# Patient Record
Sex: FEMALE | Race: WHITE | URBAN - METROPOLITAN AREA
[De-identification: names, ages, dates, MRNs, and addresses within clinical notes are randomized per-mention and may not be internally consistent; named-entity substitution may affect disease eponyms.]

---

## 2021-10-27 ENCOUNTER — OFFICE VISIT (OUTPATIENT)
Dept: URBAN - METROPOLITAN AREA CLINIC 78 | Facility: CLINIC | Age: 51
End: 2021-10-27
Payer: COMMERCIAL

## 2021-10-27 VITALS
HEIGHT: 65 IN | WEIGHT: 147.2 LBS | BODY MASS INDEX: 24.53 KG/M2 | HEART RATE: 78 BPM | SYSTOLIC BLOOD PRESSURE: 115 MMHG | DIASTOLIC BLOOD PRESSURE: 74 MMHG | TEMPERATURE: 97.3 F

## 2021-10-27 DIAGNOSIS — Z12.11 SCREENING FOR COLON CANCER: ICD-10-CM

## 2021-10-27 DIAGNOSIS — E83.110 HEREDITARY HEMOCHROMATOSIS: ICD-10-CM

## 2021-10-27 DIAGNOSIS — E83.10 CIRRHOSIS DUE TO HEMOCHROMATOSIS: ICD-10-CM

## 2021-10-27 PROBLEM — 19943007: Status: ACTIVE | Noted: 2021-10-27

## 2021-10-27 PROCEDURE — 99243 OFF/OP CNSLTJ NEW/EST LOW 30: CPT | Performed by: INTERNAL MEDICINE

## 2021-10-27 PROCEDURE — 99203 OFFICE O/P NEW LOW 30 MIN: CPT | Performed by: INTERNAL MEDICINE

## 2021-10-27 RX ORDER — MELATONIN 5 MG
1 TABLET IN THE EVENING TABLET ORAL ONCE A DAY
Status: ACTIVE | COMMUNITY

## 2021-10-27 RX ORDER — SODIUM PICOSULFATE, MAGNESIUM OXIDE, AND ANHYDROUS CITRIC ACID 10; 3.5; 12 MG/160ML; G/160ML; G/160ML
160 ML DAY #1 AND 160 ML DAY # 2 LIQUID ORAL ONCE A DAY
Qty: 320 MILLIMETER | Refills: 0 | OUTPATIENT
Start: 2021-10-27 | End: 2021-10-29

## 2021-10-27 NOTE — HPI-TODAY'S VISIT:
Patient was referred by Dr. Ajith Corona /Dr Yumiko Ayala  A copy of this document will be sent to the physician.  Patient seen in consultation for dx of cirrhosis on imaging.  Patient was dx with Hemochromatosis  when she was noted to have low platelets by her Gynae. PCP referred her to Hematologist Dr Corona who  dx her with Hereditary Hemochromatosis . She states that a  genetic testing  was done  Patient has only had two  Phlebotomy q  2 weeks and is currently on monthly phlebotomy protocol , scheduled to follow up with Oncologist q 3 months .   She has quit Etoh / red meat /iron rich foods based    Recent labs 9/28/21   Hg 12.8  Hct 36.9  Platelets 149  Ferritin level pending  SNEHA negative  Hepatitis panel neg including Hep A/B/C not immune to Hepatitis B   Patient had reached Menopause  She reports some fatigue and arthralgias   MRI of liver 9/21 : No masses on liver  Iron levels 29          GF ( M )  uterine cancer at age 47  GM ( P ) lung cancer  No Fhx of  colon or GI or liver cancer   Patient here for routine colonoscopy evaluation. The patient denies specific GI complaints with no rectal bleeding, abdominal pain, or change in bowel habits  (-) Chest pain or SOB

## 2021-10-31 LAB
ACTIN (SMOOTH MUSCLE) ANTIBODY: 28
IMMUNOGLOBULIN A, QN, SERUM: 149
IMMUNOGLOBULIN E, TOTAL: 2
IMMUNOGLOBULIN G, QN, SERUM: 1269
IMMUNOGLOBULIN M, QN, SERUM: 1555
LIVER-KIDNEY MICROSOMAL AB: 0.8
MITOCHONDRIAL (M2) ANTIBODY: 128.7

## 2021-11-01 ENCOUNTER — WEB ENCOUNTER (OUTPATIENT)
Dept: URBAN - METROPOLITAN AREA CLINIC 78 | Facility: CLINIC | Age: 51
End: 2021-11-01

## 2021-11-03 ENCOUNTER — OFFICE VISIT (OUTPATIENT)
Dept: URBAN - METROPOLITAN AREA CLINIC 78 | Facility: CLINIC | Age: 51
End: 2021-11-03
Payer: COMMERCIAL

## 2021-11-03 VITALS
TEMPERATURE: 97.8 F | SYSTOLIC BLOOD PRESSURE: 110 MMHG | HEIGHT: 65 IN | BODY MASS INDEX: 24.29 KG/M2 | DIASTOLIC BLOOD PRESSURE: 70 MMHG | WEIGHT: 145.8 LBS | HEART RATE: 94 BPM

## 2021-11-03 DIAGNOSIS — K74.5: ICD-10-CM

## 2021-11-03 DIAGNOSIS — R79.89 ELEVATED LFTS: ICD-10-CM

## 2021-11-03 DIAGNOSIS — E83.110 HEREDITARY HEMOCHROMATOSIS: ICD-10-CM

## 2021-11-03 DIAGNOSIS — L29.9 PRURITIC DISORDER: ICD-10-CM

## 2021-11-03 PROCEDURE — 99214 OFFICE O/P EST MOD 30 MIN: CPT | Performed by: INTERNAL MEDICINE

## 2021-11-03 RX ORDER — MELATONIN 5 MG
1 TABLET IN THE EVENING TABLET ORAL ONCE A DAY
Status: ACTIVE | COMMUNITY

## 2021-11-03 RX ORDER — URSODIOL 300 MG/1
1 TABLETS WITH FOOD CAPSULE ORAL THREE TIMES A DAY
Qty: 270 TABLET | Refills: 3 | OUTPATIENT
Start: 2021-11-03

## 2021-11-03 NOTE — HPI-TODAY'S VISIT:
Patient is here to review labs  She admits to dry skin and pruritis x 1 months ( always gets in winter time )  She has not needed Phlebotomy in 4 months  and managing with iron levels    Labs 5/21  AST 75  Alk Po4 415     Patient seen in consultation for dx of cirrhosis on imaging.  Patient was dx with Hemochromatosis  when she was noted to have low platelets by her Gynae. PCP referred her to Hematologist Dr Corona who  dx her with Hereditary Hemochromatosis . She states that a  genetic testing  was done  Patient has only had two  Phlebotomy q  2 weeks and is currently on monthly phlebotomy protocol , scheduled to follow up with Oncologist q 3 months .   She has quit Etoh / red meat /iron rich foods based    Recent labs 9/28/21   Hg 12.8  Hct 36.9  Platelets 149  Ferritin level pending  SNEHA negative  Hepatitis panel neg including Hep A/B/C not immune to Hepatitis B   Patient had reached Menopause  She reports some fatigue and arthralgias   MRI of liver 9/21 : No masses on liver  Iron levels 29          GF ( M )  uterine cancer at age 47  GM ( P ) lung cancer  No Fhx of  colon or GI or liver cancer   Patient here for routine colonoscopy evaluation. The patient denies specific GI complaints with no rectal bleeding, abdominal pain, or change in bowel habits  (-) Chest pain or SOB

## 2022-01-07 ENCOUNTER — OFFICE VISIT (OUTPATIENT)
Dept: URBAN - METROPOLITAN AREA SURGERY CENTER 15 | Facility: SURGERY CENTER | Age: 52
End: 2022-01-07
Payer: COMMERCIAL

## 2022-01-07 DIAGNOSIS — D12.7 ADENOMA DETERMINED BY COLORECTAL BIOPSY: ICD-10-CM

## 2022-01-07 DIAGNOSIS — D12.2 ADENOMA OF ASCENDING COLON: ICD-10-CM

## 2022-01-07 DIAGNOSIS — Z12.11 AVERAGE RISK FOR CRC. DUE TO PT'S CO-MORBID STATE WITH END STAGE DEMENTIA, HIGH RISK FOR ANESTHESIA (PER NEUROLOGY); INABILITY TO TAKE A BOWEL PREP....WOULD NOT ADVISE ANY COLORECTAL CANCER SCREENING INCLUDING STOOL TEST FOR FECAL BLOOD.: ICD-10-CM

## 2022-01-07 PROCEDURE — G8907 PT DOC NO EVENTS ON DISCHARG: HCPCS | Performed by: INTERNAL MEDICINE

## 2022-01-07 PROCEDURE — 45385 COLONOSCOPY W/LESION REMOVAL: CPT | Performed by: INTERNAL MEDICINE

## 2022-01-07 RX ORDER — URSODIOL 300 MG/1
1 TABLETS WITH FOOD CAPSULE ORAL THREE TIMES A DAY
Qty: 270 TABLET | Refills: 3 | Status: ACTIVE | COMMUNITY
Start: 2021-11-03

## 2022-01-07 RX ORDER — MELATONIN 5 MG
1 TABLET IN THE EVENING TABLET ORAL ONCE A DAY
Status: ACTIVE | COMMUNITY

## 2022-10-03 ENCOUNTER — TELEPHONE ENCOUNTER (OUTPATIENT)
Dept: URBAN - METROPOLITAN AREA CLINIC 78 | Facility: CLINIC | Age: 52
End: 2022-10-03

## 2022-11-01 LAB
A/G RATIO: 1.2
ALBUMIN: 4.3
ALKALINE PHOSPHATASE: 213
ALT (SGPT): 73
AST (SGOT): 38
BILIRUBIN, TOTAL: 0.5
BUN/CREATININE RATIO: (no result)
BUN: 18
C-REACTIVE PROTEIN, QUANT: 1.9
CALCIUM: 9.9
CARBON DIOXIDE, TOTAL: 27
CHLORIDE: 101
CREATININE: 1.03
EGFR: 66
FERRITIN, SERUM: 31
GLOBULIN, TOTAL: 3.5
GLUCOSE: 85
POTASSIUM: 4
PROTEIN, TOTAL: 7.8
SODIUM: 139

## 2022-11-02 ENCOUNTER — OFFICE VISIT (OUTPATIENT)
Dept: URBAN - METROPOLITAN AREA CLINIC 78 | Facility: CLINIC | Age: 52
End: 2022-11-02
Payer: COMMERCIAL

## 2022-11-02 VITALS
SYSTOLIC BLOOD PRESSURE: 107 MMHG | DIASTOLIC BLOOD PRESSURE: 68 MMHG | TEMPERATURE: 97.8 F | HEART RATE: 71 BPM | WEIGHT: 160 LBS | HEIGHT: 65 IN | BODY MASS INDEX: 26.66 KG/M2

## 2022-11-02 DIAGNOSIS — R10.13 DYSPEPSIA: ICD-10-CM

## 2022-11-02 DIAGNOSIS — K74.5: ICD-10-CM

## 2022-11-02 DIAGNOSIS — E83.110 HEREDITARY HEMOCHROMATOSIS: ICD-10-CM

## 2022-11-02 DIAGNOSIS — L29.9 PRURITIC DISORDER: ICD-10-CM

## 2022-11-02 DIAGNOSIS — Z86.010 PERSONAL HISTORY OF COLONIC POLYPS: ICD-10-CM

## 2022-11-02 DIAGNOSIS — K74.00 LIVER FIBROSIS: ICD-10-CM

## 2022-11-02 PROBLEM — 428283002: Status: ACTIVE | Noted: 2022-11-02

## 2022-11-02 PROBLEM — 35400008: Status: ACTIVE | Noted: 2021-10-27

## 2022-11-02 PROCEDURE — 99214 OFFICE O/P EST MOD 30 MIN: CPT | Performed by: INTERNAL MEDICINE

## 2022-11-02 RX ORDER — URSODIOL 300 MG/1
1 TABLETS WITH FOOD CAPSULE ORAL THREE TIMES A DAY
Qty: 270 TABLET | Refills: 3 | Status: ACTIVE | COMMUNITY
Start: 2021-11-03

## 2022-11-02 RX ORDER — MELATONIN 5 MG
1 TABLET IN THE EVENING TABLET ORAL ONCE A DAY
Status: ACTIVE | COMMUNITY

## 2022-11-02 RX ORDER — URSODIOL 500 MG/1
1 TABLETS WITH FOOD TABLET ORAL TWICE A DAY
Qty: 180 | Refills: 3

## 2022-11-02 RX ORDER — PANTOPRAZOLE SODIUM 40 MG/1
1 TABLET TABLET, DELAYED RELEASE ORAL ONCE A DAY
Qty: 90 | Refills: 0 | OUTPATIENT
Start: 2022-11-02

## 2022-11-02 NOTE — HPI-TODAY'S VISIT:
Patient is seen in follow follow-up:  She complains of upper abdominal heaviness or hardness.  Despite walking 3 miles a day and no significant change in her diet she reports a weight gain of 5 pounds in the last 6 months.  Her pruritus has resolved.  She is compliant with ursodiol max 900 mg a day.  Labs reviewed ferritin is 31, liver enzymes pattern is as below  Albumin 4.3, alkaline phosphatase 213 ( < 451 ) , AST 38, ALT 73.   Last visit with Dr Corona was 1 month ago , with plans for follow up in 6 months and probably longer thereof    Patient is here to review labs  She admits to dry skin and pruritis x 1 months ( always gets in winter time )  She has not needed Phlebotomy in 4 months  and managing with iron levels    Labs 5/21  AST 75  Alk Po4 415     Patient seen in consultation for dx of cirrhosis on imaging.  Patient was dx with Hemochromatosis  when she was noted to have low platelets by her Gynae. PCP referred her to Hematologist Dr Corona who  dx her with Hereditary Hemochromatosis . She states that a  genetic testing  was done  Patient has only had two  Phlebotomy q  2 weeks and is currently on monthly phlebotomy protocol , scheduled to follow up with Oncologist q 3 months .   She has quit Etoh / red meat /iron rich foods based    Recent labs 9/28/21   Hg 12.8  Hct 36.9  Platelets 149  Ferritin level pending  SNEHA negative  Hepatitis panel neg including Hep A/B/C not immune to Hepatitis B   Patient had reached Menopause  She reports some fatigue and arthralgias   MRI of liver 9/21 : No masses on liver  Iron levels 29          GF ( M )  uterine cancer at age 47  GM ( P ) lung cancer  No Fhx of  colon or GI or liver cancer   Patient here for routine colonoscopy evaluation. The patient denies specific GI complaints with no rectal bleeding, abdominal pain, or change in bowel habits  (-) Chest pain or SOB

## 2022-11-02 NOTE — PHYSICAL EXAM GASTROINTESTINAL
Abdomen , soft, mild tenderness to palpation  nondistended , no guarding or rigidity , no masses palpable , normal bowel sounds , Liver and Spleen ,Hepatomegaly present , liver tender , spleen not palpable

## 2022-11-03 PROBLEM — 62484002: Status: ACTIVE | Noted: 2022-11-02

## 2022-11-17 ENCOUNTER — WEB ENCOUNTER (OUTPATIENT)
Dept: URBAN - METROPOLITAN AREA CLINIC 77 | Facility: CLINIC | Age: 52
End: 2022-11-17

## 2022-11-23 ENCOUNTER — OFFICE VISIT (OUTPATIENT)
Dept: URBAN - METROPOLITAN AREA CLINIC 77 | Facility: CLINIC | Age: 52
End: 2022-11-23
Payer: COMMERCIAL

## 2022-11-23 DIAGNOSIS — K74.5: ICD-10-CM

## 2022-11-23 DIAGNOSIS — K82.4 GALLBLADDER POLYP: ICD-10-CM

## 2022-11-23 PROCEDURE — 76705 ECHO EXAM OF ABDOMEN: CPT | Performed by: INTERNAL MEDICINE

## 2022-11-23 PROCEDURE — 93975 VASCULAR STUDY: CPT | Performed by: INTERNAL MEDICINE

## 2022-11-30 PROBLEM — 1761006 BILIARY CIRRHOSIS: Status: ACTIVE | Noted: 2022-11-02

## 2022-12-20 ENCOUNTER — OFFICE VISIT (OUTPATIENT)
Dept: URBAN - METROPOLITAN AREA SURGERY CENTER 15 | Facility: SURGERY CENTER | Age: 52
End: 2022-12-20

## 2022-12-20 ENCOUNTER — CLAIMS CREATED FROM THE CLAIM WINDOW (OUTPATIENT)
Dept: URBAN - METROPOLITAN AREA SURGERY CENTER 15 | Facility: SURGERY CENTER | Age: 52
End: 2022-12-20
Payer: COMMERCIAL

## 2022-12-20 DIAGNOSIS — K29.60 ADENOPAPILLOMATOSIS GASTRICA: ICD-10-CM

## 2022-12-20 DIAGNOSIS — K22.89 DILATATION OF ESOPHAGUS: ICD-10-CM

## 2022-12-20 PROCEDURE — G8907 PT DOC NO EVENTS ON DISCHARG: HCPCS | Performed by: INTERNAL MEDICINE

## 2022-12-20 PROCEDURE — 43239 EGD BIOPSY SINGLE/MULTIPLE: CPT | Performed by: INTERNAL MEDICINE

## 2022-12-23 ENCOUNTER — WEB ENCOUNTER (OUTPATIENT)
Dept: URBAN - METROPOLITAN AREA CLINIC 78 | Facility: CLINIC | Age: 52
End: 2022-12-23

## 2022-12-23 RX ORDER — URSODIOL 500 MG/1
1 TABLETS WITH FOOD TABLET ORAL TWICE A DAY
Qty: 180 | Refills: 3

## 2023-05-01 ENCOUNTER — WEB ENCOUNTER (OUTPATIENT)
Dept: URBAN - METROPOLITAN AREA CLINIC 78 | Facility: CLINIC | Age: 53
End: 2023-05-01

## 2024-01-04 ENCOUNTER — TELEPHONE ENCOUNTER (OUTPATIENT)
Dept: URBAN - METROPOLITAN AREA CLINIC 78 | Facility: CLINIC | Age: 54
End: 2024-01-04

## 2024-01-04 RX ORDER — URSODIOL 500 MG/1
1 TABLETS WITH FOOD TABLET ORAL TWICE A DAY
Qty: 180 | Refills: 3

## 2024-01-06 ENCOUNTER — WEB ENCOUNTER (OUTPATIENT)
Dept: URBAN - METROPOLITAN AREA CLINIC 78 | Facility: CLINIC | Age: 54
End: 2024-01-06

## 2024-01-06 RX ORDER — URSODIOL 500 MG/1
1 TABLETS WITH FOOD TABLET ORAL TWICE A DAY
Qty: 180 | Refills: 3

## 2024-01-10 ENCOUNTER — OFFICE VISIT (OUTPATIENT)
Dept: URBAN - METROPOLITAN AREA CLINIC 78 | Facility: CLINIC | Age: 54
End: 2024-01-10
Payer: COMMERCIAL

## 2024-01-10 ENCOUNTER — LAB OUTSIDE AN ENCOUNTER (OUTPATIENT)
Dept: URBAN - METROPOLITAN AREA CLINIC 78 | Facility: CLINIC | Age: 54
End: 2024-01-10

## 2024-01-10 ENCOUNTER — DASHBOARD ENCOUNTERS (OUTPATIENT)
Age: 54
End: 2024-01-10

## 2024-01-10 VITALS
DIASTOLIC BLOOD PRESSURE: 73 MMHG | WEIGHT: 164 LBS | BODY MASS INDEX: 27.32 KG/M2 | SYSTOLIC BLOOD PRESSURE: 111 MMHG | HEIGHT: 65 IN | TEMPERATURE: 98.1 F | HEART RATE: 91 BPM

## 2024-01-10 DIAGNOSIS — Z86.010 PERSONAL HISTORY OF COLONIC POLYPS: ICD-10-CM

## 2024-01-10 DIAGNOSIS — K74.5: ICD-10-CM

## 2024-01-10 DIAGNOSIS — E83.110 HEREDITARY HEMOCHROMATOSIS: ICD-10-CM

## 2024-01-10 PROCEDURE — 99214 OFFICE O/P EST MOD 30 MIN: CPT | Performed by: INTERNAL MEDICINE

## 2024-01-10 RX ORDER — URSODIOL 500 MG/1
1 TABLETS WITH FOOD TABLET ORAL TWICE A DAY
Qty: 180 | Refills: 3 | Status: ACTIVE | COMMUNITY

## 2024-01-10 RX ORDER — PANTOPRAZOLE SODIUM 40 MG/1
1 TABLET TABLET, DELAYED RELEASE ORAL ONCE A DAY
Qty: 90 | Refills: 0 | Status: ACTIVE | COMMUNITY
Start: 2022-11-02

## 2024-01-10 RX ORDER — MELATONIN 5 MG
1 TABLET IN THE EVENING TABLET ORAL ONCE A DAY
Status: ACTIVE | COMMUNITY

## 2024-01-10 RX ORDER — URSODIOL 500 MG/1
1 TABLETS WITH FOOD TABLET ORAL TWICE A DAY
Qty: 180 | Refills: 3

## 2024-01-10 NOTE — HPI-TODAY'S VISIT:
Patient is seen in follow up  Patient is doing well overall and denies any phlebotomy in the last 6 months.  She feels the symptoms when she feels this time to get a phlebotomy.  She is compliant with Berny 1000 mg . No Berny in last  1 week ( mom is in hospital )  Denies GI complains   Last BMD :Never  Pruritis : None  Reports normal cholesterol  Has Mammogram scheduled in December Denies weight loss  Last EGD was in 2022/; gastritis , H.pylori negative   PREVIOUS ENCOUNTER: She complains of upper abdominal heaviness or hardness.  Despite walking 3 miles a day and no significant change in her diet she reports a weight gain of 5 pounds in the last 6 months.  Her pruritus has resolved.  She is compliant with ursodiol max 900 mg a day.  Labs reviewed ferritin is 31, liver enzymes pattern is as below  Albumin 4.3, alkaline phosphatase 213 ( < 451 ) , AST 38, ALT 73.   Last visit with Dr Corona was 1 month ago , with plans for follow up in 6 months and probably longer thereof    Patient is here to review labs  She admits to dry skin and pruritis x 1 months ( always gets in winter time )  She has not needed Phlebotomy in 4 months  and managing with iron levels    Labs 5/21  AST 75  Alk Po4 415     Patient seen in consultation for dx of cirrhosis on imaging.  Patient was dx with Hemochromatosis  when she was noted to have low platelets by her Gynae. PCP referred her to Hematologist Dr Corona who  dx her with Hereditary Hemochromatosis . She states that a  genetic testing  was done  Patient has only had two  Phlebotomy q  2 weeks and is currently on monthly phlebotomy protocol , scheduled to follow up with Oncologist q 3 months .   She has quit Etoh / red meat /iron rich foods based    Recent labs 9/28/21   Hg 12.8  Hct 36.9  Platelets 149  Ferritin level pending  SNEHA negative  Hepatitis panel neg including Hep A/B/C not immune to Hepatitis B   Patient had reached Menopause  She reports some fatigue and arthralgias   MRI of liver 9/21 : No masses on liver  Iron levels 29          GF ( M )  uterine cancer at age 47  GM ( P ) lung cancer  No Fhx of  colon or GI or liver cancer   Patient here for routine colonoscopy evaluation. The patient denies specific GI complaints with no rectal bleeding, abdominal pain, or change in bowel habits  (-) Chest pain or SOB

## 2024-01-19 LAB
A/G RATIO: 1.7
ALBUMIN: 4.6
ALKALINE PHOSPHATASE: 161
ALT (SGPT): 60
AST (SGOT): 37
BILIRUBIN, TOTAL: 0.4
BUN/CREATININE RATIO: 13
BUN: 14
CALCIUM: 10
CARBON DIOXIDE, TOTAL: 23
CHLORIDE: 102
CREATININE: 1.05
EGFR: 64
GLOBULIN, TOTAL: 2.7
GLUCOSE: 99
HEMATOCRIT: 40.7
HEMATOLOGY COMMENTS:: (no result)
HEMOGLOBIN: 13.6
MCH: 33.3
MCHC: 33.4
MCV: 100
NRBC: (no result)
PLATELETS: 76
POTASSIUM: 4.4
PROTEIN, TOTAL: 7.3
RBC: 4.09
RDW: 11.8
SODIUM: 142
WBC: 5.8

## 2024-01-30 ENCOUNTER — TELEPHONE ENCOUNTER (OUTPATIENT)
Dept: URBAN - METROPOLITAN AREA CLINIC 78 | Facility: CLINIC | Age: 54
End: 2024-01-30

## 2024-01-30 ENCOUNTER — LAB OUTSIDE AN ENCOUNTER (OUTPATIENT)
Dept: URBAN - METROPOLITAN AREA CLINIC 78 | Facility: CLINIC | Age: 54
End: 2024-01-30

## 2024-02-01 ENCOUNTER — LAB (OUTPATIENT)
Dept: URBAN - METROPOLITAN AREA CLINIC 78 | Facility: CLINIC | Age: 54
End: 2024-02-01

## 2024-06-24 ENCOUNTER — OFFICE VISIT (OUTPATIENT)
Dept: URBAN - METROPOLITAN AREA CLINIC 78 | Facility: CLINIC | Age: 54
End: 2024-06-24
Payer: COMMERCIAL

## 2024-06-24 VITALS
DIASTOLIC BLOOD PRESSURE: 70 MMHG | RESPIRATION RATE: 14 BRPM | HEART RATE: 69 BPM | WEIGHT: 152.6 LBS | SYSTOLIC BLOOD PRESSURE: 111 MMHG | TEMPERATURE: 98.1 F | HEIGHT: 65 IN | BODY MASS INDEX: 25.43 KG/M2

## 2024-06-24 DIAGNOSIS — K74.5: ICD-10-CM

## 2024-06-24 DIAGNOSIS — D69.6 THROMBOCYTOPENIA: ICD-10-CM

## 2024-06-24 DIAGNOSIS — E83.110 HEREDITARY HEMOCHROMATOSIS: ICD-10-CM

## 2024-06-24 PROBLEM — 302215000: Status: ACTIVE | Noted: 2024-06-24

## 2024-06-24 PROCEDURE — 99213 OFFICE O/P EST LOW 20 MIN: CPT | Performed by: INTERNAL MEDICINE

## 2024-06-24 RX ORDER — URSODIOL 500 MG/1
1 TABLETS WITH FOOD TABLET ORAL TWICE A DAY
Qty: 180 | Refills: 3 | Status: ACTIVE | COMMUNITY

## 2024-06-24 RX ORDER — OBETICHOLIC ACID 5 MG/1
1 TABLET TABLET, FILM COATED ORAL
Qty: 15 | OUTPATIENT
Start: 2024-06-24 | End: 2024-07-24

## 2024-06-24 RX ORDER — PANTOPRAZOLE SODIUM 40 MG/1
1 TABLET TABLET, DELAYED RELEASE ORAL ONCE A DAY
Qty: 90 | Refills: 0 | Status: ACTIVE | COMMUNITY
Start: 2022-11-02

## 2024-06-24 RX ORDER — URSODIOL 500 MG/1
1 TABLETS WITH FOOD TABLET ORAL TWICE A DAY
Qty: 180 | Refills: 3

## 2024-06-24 RX ORDER — MELATONIN 5 MG
1 TABLET IN THE EVENING TABLET ORAL ONCE A DAY
Status: ACTIVE | COMMUNITY

## 2024-06-24 NOTE — PHYSICAL EXAM HENT:
Head,  normocephalic,  atraumatic,  Face,  Face within normal limits,  Ears,  External ears within normal limits,  Nose/Nasopharynx,  External nose  normal appearance,Mouth and Throat,  Oral cavity appearance normal,,  Lips,  Appearance normal , Head,  normocephalic,  atraumatic,  Face,  Face within normal limits,  Ears,  External ears within normal limits,  Nose/Nasopharynx,  External nose  normal appearance,Mouth and Throat,  Oral cavity appearance normal,,  Lips,  Appearance normal

## 2024-06-24 NOTE — HPI-TODAY'S VISIT:
She is here to follow-up on the labs, imaging.  The blood work from January 10, 2024 revealed a BUN of 14 with a creatinine of 1.05 Alkaline phosphatase is 161 it is down from 233 ALT is 60 it is down from 77 AST is 37 it is down from 46 TB 0.4 Ferritin is 60 CBC reveals a normal hemoglobin of 13.6 with a hematocrit of 40.7 the MCV is high at 100 the platelet count is low at 76 it is down from 112 the MRI performed February 3, 2024 reviewed it reveals cirrhotic morphology of liver no suspicious focal hepatic lesions to support HCC portal vein is patent spleen is normal in size measuring up to 10.3 in craniocaudal dimension gallbladder is unremarkable there is some renal cysts and bone density dated 2 February 2024 reveals normal bone density  Denies pruritis  Sleeping okay  Weight loss 8 lbs intentional  Drinks rarely    Patient is doing well overall and denies any phlebotomy in the last 6 months.  She feels the symptoms when she feels this time to get a phlebotomy.  She is compliant with Berny 1000 mg . No Berny in last  1 week ( mom is in hospital )  Denies GI complains   Last BMD :Never  Pruritis : None  Reports normal cholesterol  Has Mammogram scheduled in December Denies weight loss  Last EGD was in 2022/; gastritis , H.pylori negative   PREVIOUS ENCOUNTER: She complains of upper abdominal heaviness or hardness.  Despite walking 3 miles a day and no significant change in her diet she reports a weight gain of 5 pounds in the last 6 months.  Her pruritus has resolved.  She is compliant with ursodiol max 900 mg a day.  Labs reviewed ferritin is 31, liver enzymes pattern is as below  Albumin 4.3, alkaline phosphatase 213 ( < 451 ) , AST 38, ALT 73.   Last visit with Dr Corona was 1 month ago , with plans for follow up in 6 months and probably longer thereof    Patient is here to review labs  She admits to dry skin and pruritis x 1 months ( always gets in winter time )  She has not needed Phlebotomy in 4 months  and managing with iron levels    Labs 5/21  AST 75  Alk Po4 415     Patient seen in consultation for dx of cirrhosis on imaging.  Patient was dx with Hemochromatosis  when she was noted to have low platelets by her Gynae. PCP referred her to Hematologist Dr Corona who  dx her with Hereditary Hemochromatosis . She states that a  genetic testing  was done  Patient has only had two  Phlebotomy q  2 weeks and is currently on monthly phlebotomy protocol , scheduled to follow up with Oncologist q 3 months .   She has quit Etoh / red meat /iron rich foods based    Recent labs 9/28/21   Hg 12.8  Hct 36.9  Platelets 149  Ferritin level pending  SNEHA negative  Hepatitis panel neg including Hep A/B/C not immune to Hepatitis B   Patient had reached Menopause  She reports some fatigue and arthralgias   MRI of liver 9/21 : No masses on liver  Iron levels 29          GF ( M )  uterine cancer at age 47  GM ( P ) lung cancer  No Fhx of  colon or GI or liver cancer   Patient here for routine colonoscopy evaluation. The patient denies specific GI complaints with no rectal bleeding, abdominal pain, or change in bowel habits  (-) Chest pain or SOB

## 2024-06-24 NOTE — PHYSICAL EXAM NECK/THYROID:
normal appearance , without tenderness upon palpation , no deformities , trachea midline , , normal appearance , without tenderness upon palpation , no deformities , trachea midline ,

## 2024-06-24 NOTE — PHYSICAL EXAM CHEST:
breathing is un-labored without accessory muscle use, normal breath sounds , breathing is un-labored without accessory muscle use, normal breath sounds

## 2024-07-13 ENCOUNTER — TELEPHONE ENCOUNTER (OUTPATIENT)
Dept: URBAN - METROPOLITAN AREA CLINIC 78 | Facility: CLINIC | Age: 54
End: 2024-07-13

## 2024-07-13 RX ORDER — OBETICHOLIC ACID 5 MG/1
1 TABLET TABLET, FILM COATED ORAL DAILY
Qty: 60 TABLET | Refills: 0 | OUTPATIENT
Start: 2024-07-16

## 2024-07-17 ENCOUNTER — TELEPHONE ENCOUNTER (OUTPATIENT)
Dept: URBAN - METROPOLITAN AREA CLINIC 78 | Facility: CLINIC | Age: 54
End: 2024-07-17

## 2024-07-18 ENCOUNTER — TELEPHONE ENCOUNTER (OUTPATIENT)
Dept: URBAN - METROPOLITAN AREA CLINIC 78 | Facility: CLINIC | Age: 54
End: 2024-07-18

## 2024-07-22 ENCOUNTER — TELEPHONE ENCOUNTER (OUTPATIENT)
Dept: URBAN - METROPOLITAN AREA CLINIC 78 | Facility: CLINIC | Age: 54
End: 2024-07-22

## 2024-07-22 RX ORDER — OBETICHOLIC ACID 5 MG/1
1 TABLET TABLET, FILM COATED ORAL DAILY
Qty: 30 TABLET | Refills: 0 | OUTPATIENT
Start: 2024-06-24

## 2024-08-20 ENCOUNTER — TELEPHONE ENCOUNTER (OUTPATIENT)
Dept: URBAN - METROPOLITAN AREA CLINIC 78 | Facility: CLINIC | Age: 54
End: 2024-08-20

## 2024-08-20 RX ORDER — OBETICHOLIC ACID 5 MG/1
1 TABLET TABLET, FILM COATED ORAL DAILY
Qty: 30 TABLET | Refills: 11
Start: 2024-06-24

## 2024-08-29 ENCOUNTER — TELEPHONE ENCOUNTER (OUTPATIENT)
Dept: URBAN - METROPOLITAN AREA CLINIC 78 | Facility: CLINIC | Age: 54
End: 2024-08-29

## 2024-11-12 ENCOUNTER — TELEPHONE ENCOUNTER (OUTPATIENT)
Dept: URBAN - METROPOLITAN AREA CLINIC 78 | Facility: CLINIC | Age: 54
End: 2024-11-12

## 2024-11-12 RX ORDER — OBETICHOLIC ACID 5 MG/1
1 TABLET TABLET, FILM COATED ORAL DAILY
Qty: 30 TABLET | Refills: 11
Start: 2024-06-24

## 2024-11-21 ENCOUNTER — TELEPHONE ENCOUNTER (OUTPATIENT)
Dept: URBAN - METROPOLITAN AREA CLINIC 78 | Facility: CLINIC | Age: 54
End: 2024-11-21

## 2024-12-03 ENCOUNTER — TELEPHONE ENCOUNTER (OUTPATIENT)
Dept: URBAN - METROPOLITAN AREA CLINIC 78 | Facility: CLINIC | Age: 54
End: 2024-12-03

## 2024-12-03 ENCOUNTER — WEB ENCOUNTER (OUTPATIENT)
Dept: URBAN - METROPOLITAN AREA CLINIC 78 | Facility: CLINIC | Age: 54
End: 2024-12-03

## 2024-12-03 RX ORDER — URSODIOL 500 MG/1
1 TABLETS WITH FOOD TABLET ORAL TWICE A DAY
Qty: 180 | Refills: 3

## 2024-12-09 ENCOUNTER — OFFICE VISIT (OUTPATIENT)
Dept: URBAN - METROPOLITAN AREA CLINIC 78 | Facility: CLINIC | Age: 54
End: 2024-12-09

## 2024-12-09 VITALS
HEART RATE: 94 BPM | WEIGHT: 153.8 LBS | HEIGHT: 65 IN | TEMPERATURE: 98.1 F | SYSTOLIC BLOOD PRESSURE: 138 MMHG | DIASTOLIC BLOOD PRESSURE: 87 MMHG | BODY MASS INDEX: 25.62 KG/M2 | RESPIRATION RATE: 14 BRPM

## 2024-12-09 DIAGNOSIS — K74.5: ICD-10-CM

## 2024-12-09 DIAGNOSIS — E83.110 HEREDITARY HEMOCHROMATOSIS: ICD-10-CM

## 2024-12-09 DIAGNOSIS — D69.6 THROMBOCYTOPENIA: ICD-10-CM

## 2024-12-09 DIAGNOSIS — Z86.0101 PERSONAL HISTORY OF ADENOMATOUS AND SERRATED COLON POLYPS: ICD-10-CM

## 2024-12-09 PROCEDURE — 99214 OFFICE O/P EST MOD 30 MIN: CPT | Performed by: INTERNAL MEDICINE

## 2024-12-09 RX ORDER — PANTOPRAZOLE SODIUM 40 MG/1
1 TABLET TABLET, DELAYED RELEASE ORAL ONCE A DAY
Qty: 90 | Refills: 0 | Status: ACTIVE | COMMUNITY
Start: 2022-11-02

## 2024-12-09 RX ORDER — URSODIOL 500 MG/1
1 TABLETS WITH FOOD TABLET ORAL TWICE A DAY
Qty: 180 | Refills: 3

## 2024-12-09 RX ORDER — OBETICHOLIC ACID 5 MG/1
1 TABLET TABLET, FILM COATED ORAL DAILY
Qty: 30 TABLET | Refills: 11

## 2024-12-09 RX ORDER — OBETICHOLIC ACID 5 MG/1
1 TABLET TABLET, FILM COATED ORAL DAILY
Qty: 30 TABLET | Refills: 11 | Status: ACTIVE | COMMUNITY
Start: 2024-06-24

## 2024-12-09 RX ORDER — MELATONIN 5 MG
1 TABLET IN THE EVENING TABLET ORAL ONCE A DAY
Status: ACTIVE | COMMUNITY

## 2024-12-09 RX ORDER — URSODIOL 500 MG/1
1 TABLETS WITH FOOD TABLET ORAL TWICE A DAY
Qty: 180 | Refills: 3 | Status: ACTIVE | COMMUNITY

## 2024-12-09 RX ORDER — OBETICHOLIC ACID 5 MG/1
1 TABLET TABLET, FILM COATED ORAL DAILY
Qty: 60 TABLET | Refills: 0 | Status: ACTIVE | COMMUNITY
Start: 2024-07-16

## 2024-12-09 NOTE — HPI-TODAY'S VISIT:
- Follow up for primary biliary cirrhosis (PBC) and hemochromatosis - Recently completed 2-month course of [place], ran out approximately 1 week ago - Reports good tolerance to [place] with no side effects - No alcohol use - Recent hematology follow up in August 2024 showed platelets returned to normal range - Released from hematology care with recommendation for regular iron checks with gynecologist - No dizzy spells - Reports feeling well  Last labs from July 2024 showed elevated MCV  at 100 Alk PO4 at 151 < 161 - MRI (2024):  Redemonstrated cirrhotic morphology of liver, no suspicious focal hepatic lesions, patent portal vein, normal spleen size -  Bone density scan: Normal - Recent hematology labs (August 2024): Platelets within normal range - No phlebotomy required in over 1 year   PREVIOUS ENCOUNTER: She is here to follow-up on the labs, imaging.  The blood work from January 10, 2024 revealed a BUN of 14 with a creatinine of 1.05 Alkaline phosphatase is 161 it is down from 233 ALT is 60 it is down from 77 AST is 37 it is down from 46 TB 0.4 Ferritin is 60 CBC reveals a normal hemoglobin of 13.6 with a hematocrit of 40.7 the MCV is high at 100 the platelet count is low at 76 it is down from 112 the MRI performed February 3, 2024 reviewed it reveals cirrhotic morphology of liver no suspicious focal hepatic lesions to support HCC portal vein is patent spleen is normal in size measuring up to 10.3 in craniocaudal dimension gallbladder is unremarkable there is some renal cysts and bone density dated 2 February 2024 reveals normal bone density  Denies pruritis  Sleeping okay  Weight loss 8 lbs intentional  Drinks rarely    Patient is doing well overall and denies any phlebotomy in the last 6 months.  She feels the symptoms when she feels this time to get a phlebotomy.  She is compliant with Berny 1000 mg . No Berny in last  1 week ( mom is in hospital )  Denies GI complains   Last BMD :Never  Pruritis : None  Reports normal cholesterol  Has Mammogram scheduled in December Denies weight loss  Last EGD was in 2022/; gastritis , H.pylori negative   PREVIOUS ENCOUNTER: She complains of upper abdominal heaviness or hardness.  Despite walking 3 miles a day and no significant change in her diet she reports a weight gain of 5 pounds in the last 6 months.  Her pruritus has resolved.  She is compliant with ursodiol max 900 mg a day.  Labs reviewed ferritin is 31, liver enzymes pattern is as below  Albumin 4.3, alkaline phosphatase 213 ( < 451 ) , AST 38, ALT 73.   Last visit with Dr Corona was 1 month ago , with plans for follow up in 6 months and probably longer thereof    Patient is here to review labs  She admits to dry skin and pruritis x 1 months ( always gets in winter time )  She has not needed Phlebotomy in 4 months  and managing with iron levels    Labs 5/21  AST 75  Alk Po4 415     Patient seen in consultation for dx of cirrhosis on imaging.  Patient was dx with Hemochromatosis  when she was noted to have low platelets by her Gynae. PCP referred her to Hematologist Dr Corona who  dx her with Hereditary Hemochromatosis . She states that a  genetic testing  was done  Patient has only had two  Phlebotomy q  2 weeks and is currently on monthly phlebotomy protocol , scheduled to follow up with Oncologist q 3 months .   She has quit Etoh / red meat /iron rich foods based    Recent labs 9/28/21   Hg 12.8  Hct 36.9  Platelets 149  Ferritin level pending  SNEHA negative  Hepatitis panel neg including Hep A/B/C not immune to Hepatitis B   Patient had reached Menopause  She reports some fatigue and arthralgias   MRI of liver 9/21 : No masses on liver  Iron levels 29          GF ( M )  uterine cancer at age 47  GM ( P ) lung cancer  No Fhx of  colon or GI or liver cancer   Patient here for routine colonoscopy evaluation. The patient denies specific GI complaints with no rectal bleeding, abdominal pain, or change in bowel habits  (-) Chest pain or SOB